# Patient Record
Sex: FEMALE | Race: OTHER | NOT HISPANIC OR LATINO | URBAN - METROPOLITAN AREA
[De-identification: names, ages, dates, MRNs, and addresses within clinical notes are randomized per-mention and may not be internally consistent; named-entity substitution may affect disease eponyms.]

---

## 2024-06-12 ENCOUNTER — EMERGENCY (EMERGENCY)
Facility: HOSPITAL | Age: 39
LOS: 1 days | Discharge: HOME | End: 2024-06-12
Attending: EMERGENCY MEDICINE | Admitting: EMERGENCY MEDICINE
Payer: OTHER MISCELLANEOUS

## 2024-06-12 VITALS
HEART RATE: 86 BPM | WEIGHT: 134.92 LBS | TEMPERATURE: 98 F | SYSTOLIC BLOOD PRESSURE: 117 MMHG | OXYGEN SATURATION: 98 % | RESPIRATION RATE: 18 BRPM | DIASTOLIC BLOOD PRESSURE: 77 MMHG
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
## 2024-06-12 DIAGNOSIS — Z88.0 ALLERGY STATUS TO PENICILLIN: ICD-10-CM

## 2024-06-12 DIAGNOSIS — Z88.6 ALLERGY STATUS TO ANALGESIC AGENT: ICD-10-CM

## 2024-06-12 DIAGNOSIS — S30.0XXA CONTUSION OF LOWER BACK AND PELVIS, INITIAL ENCOUNTER: ICD-10-CM
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
## 2024-06-12 DIAGNOSIS — M54.50 LOW BACK PAIN, UNSPECIFIED: ICD-10-CM

## 2024-06-12 LAB — HCG UR QL: NEGATIVE — SIGNIFICANT CHANGE UP

## 2024-06-12 PROCEDURE — 99284 EMERGENCY DEPT VISIT MOD MDM: CPT

## 2024-06-12 PROCEDURE — 72100 X-RAY EXAM L-S SPINE 2/3 VWS: CPT | Mod: 26

## 2024-06-12 RX ORDER — ACETAMINOPHEN 500 MG
975 TABLET ORAL ONCE
Refills: 0 | Status: COMPLETED | OUTPATIENT
Start: 2024-06-12 | End: 2024-06-12

## 2024-06-12 RX ADMIN — Medication 975 MILLIGRAM(S): at 19:36

## 2024-06-12 NOTE — ED PROVIDER NOTE - NSFOLLOWUPINSTRUCTIONS_ED_ALL_ED_FT
Tailbone Injury  A side view of a person's spine with a close-up showing the coccyx.  The tailbone is the small bone at the lower end of the spine. The tailbone is also called the coccyx.A tailbone injury may involve stretched ligaments, bruising, or a broken bone/break (fracture). Tailbone injuries can be painful, and some may take a long time to heal.    What are the causes?  This condition may be caused by:  Falling and landing on the tailbone.  Repeated strain or friction from sitting for long periods of time. This may include actions such as rowing or bicycling.  Childbirth.  In some cases, the cause may not be known.    What are the signs or symptoms?  Symptoms of this condition include:  Pain in the tailbone area or lower back, especially when sitting.  Pain or difficulty when standing up from a sitting position.  Bruising or swelling in the tailbone area.  Painful bowel movements.  In females, pain during sex.  How is this diagnosed?  This condition may be diagnosed based on your symptoms and a physical exam.    If your health care provider suspects a fracture, you may have additional tests, such as:  X-rays.  CT scan.  MRI.  How is this treated?  Most tailbone injuries heal on their own in 4–6 weeks. However, recovery time may be longer if there is a fracture.    If treatment is needed, it may include:  NSAIDs or other over-the-counter medicines to help relieve your pain.  Rubber or inflated ring or cushion to take pressure off the tailbone when sitting.  Physical therapy.  Injection with local anesthesia and steroid medicine. This is done only if the pain does not improve over time with over-the-counter pain medicines.  Follow these instructions at home:  Activity    Rest as told by your health care provider.  Do not sit for a long time without moving. Get up to take short walks every 1–2 hours. This will improve blood flow and breathing. Ask for help if you feel weak or unsteady.  Wear appropriate padding and sports gear when bicycling or rowing. This will prevent repeating an injury that is caused by strain or friction.  Do exercises as told by your health care provider.  Increase your activity as the pain allows.  Return to your normal activities as told by your health care provider. Ask your health care provider what activities are safe for you.  Managing pain, stiffness, and swelling    To help decrease discomfort when sitting:  Sit on your rubber or inflated ring or cushion as told by your health care provider.  Lean forward when you sit.  If directed, put ice on the injured area. To do this:  Put ice in a plastic bag.  Place a towel between your skin and the bag.  Leave the ice on for 20 minutes, 2–3 times per day for the first 1–2 days.  If your skin turns bright red, remove the ice right away to prevent skin damage. The risk of skin damage is higher if you cannot feel pain, heat, or cold.  If directed, apply heat to the affected area as often as told by your health care provider. Use the heat source that your health care provider recommends, such as a moist heat pack or a heating pad.  Place a towel between your skin and the heat source.  Leave the heat on for 20–30 minutes.  If your skin turns bright red, remove the heat right away to prevent burns. The risk of burns is higher if you cannot feel pain, heat, or cold.  General instructions    Take over-the-counter and prescription medicines only as told by your health care provider.  Your condition or medicine may cause constipation or painful bowel movements. To prevent or treat constipation, you may need to:  Drink enough fluid to keep your urine pale yellow.  Take over-the-counter or prescription medicines.  Eat foods that are high in fiber, such as beans, whole grains, and fresh fruits and vegetables.  Limit foods that are high in fat and processed sugars, such as fried or sweet foods.  Contact a health care provider if:  Your pain becomes worse or is not controlled with medicine.  Your bowel movements cause a great deal of discomfort.  You are unable to have a bowel movement after 4 days.  You have pain during sex.  Summary  A tailbone injury may involve stretched ligaments, bruising, or a broken bone/break (fracture).  Tailbone injuries can be painful. Most heal on their own in 4–6 weeks.  Treatment may include taking NSAIDs, doing physical therapy, or using a rubber or inflated ring or cushion when sitting.  Follow any recommendations from your health care provider to prevent or treat constipation.  This information is not intended to replace advice given to you by your health care provider. Make sure you discuss any questions you have with your health care provider

## 2024-06-12 NOTE — ED PROVIDER NOTE - PATIENT PORTAL LINK FT
You can access the FollowMyHealth Patient Portal offered by [facility] by registering at the following website: [email or web address]. By joining [facility]’s FollowMyHealth portal, you will also be able to view your health information using other applications (apps) compatible with our system.
TELEMETRY

## 2024-06-12 NOTE — ED PROVIDER NOTE - ADDITIONAL NOTES AND INSTRUCTIONS:
Please excuse Ms. Matson from work as she recovers from a traumatic injury for which she was treated in the Emergency Dept.  Thank you,  Dr. Yadiel Santoyo MD

## 2024-06-12 NOTE — ED PROVIDER NOTE - CLINICAL SUMMARY MEDICAL DECISION MAKING FREE TEXT BOX
No fx on x-ray, sx and mechanism consistent with contusion, feeling improved with tylenol, neurovasc intact, d/c home with return precautions and anticipatory guidance.

## 2024-06-12 NOTE — ED PROVIDER NOTE - OBJECTIVE STATEMENT
38-year-old female with no reported past medical history who presents with lower back versus buttock/coccygeal pain after falling onto her buttock at work 2 days ago.  Patient works as a  and is slipped in her seat falling onto the floor.  No head strike.  Was able to ambulate on scene without difficulty.  Patient taking Tylenol for pain with good effect but has persistent pain when sitting.  No numbness or tingling.  No weakness.  No bowel or bladder dysfunction.

## 2024-06-12 NOTE — ED PROVIDER NOTE - PHYSICAL EXAMINATION
VITAL SIGNS: I have reviewed nursing notes and confirm.  CONSTITUTIONAL: Well-developed; well-nourished; in no acute distress.  SKIN: Skin exam is warm and dry, no acute rash.  HEAD: Normocephalic; atraumatic.  EYES: PERRL, EOM intact; conjunctiva and sclera clear.  ENT: No nasal discharge; airway clear.  NECK: Supple; non tender.  CARD: RRR  RESP: CTA b/l  ABD: soft; non-distended; non-tender  BACK: mild midline lumbar TTP w/out step-offs or skin changes  EXT: Normal ROM. No cyanosis or edema. Non-ttp all ext, distal pulses intact  NEURO: Alert, oriented. Grossly unremarkable.  PSYCH: Cooperative, appropriate.